# Patient Record
Sex: MALE | Race: WHITE
[De-identification: names, ages, dates, MRNs, and addresses within clinical notes are randomized per-mention and may not be internally consistent; named-entity substitution may affect disease eponyms.]

---

## 2018-07-03 ENCOUNTER — HOSPITAL ENCOUNTER (OUTPATIENT)
Dept: HOSPITAL 9 - MADRAD | Age: 48
Discharge: HOME | End: 2018-07-03
Attending: FAMILY MEDICINE
Payer: COMMERCIAL

## 2018-07-03 DIAGNOSIS — R09.89: ICD-10-CM

## 2018-07-03 DIAGNOSIS — I51.7: ICD-10-CM

## 2018-07-03 DIAGNOSIS — J45.909: Primary | ICD-10-CM

## 2018-07-03 PROCEDURE — 71046 X-RAY EXAM CHEST 2 VIEWS: CPT

## 2018-07-03 NOTE — RAD
CHEST TWO VIEWS:

7/3/18

 

HISTORY: 

Dyspnea. Cough and congestion.

 

FINDINGS:  

The cardiac silhouette is enlarged. Pulmonary vasculature upper limits of normal. Mediastinum midline
. No lobar consolidation or evidence of pneumothorax.

 

IMPRESSION:  

Cardiomegaly. Borderline pulmonary vascular congestion. 

 

POS: [facility]

## 2020-11-07 ENCOUNTER — HOSPITAL ENCOUNTER (EMERGENCY)
Dept: HOSPITAL 9 - MADERS | Age: 50
Discharge: HOME | End: 2020-11-07
Payer: SELF-PAY

## 2020-11-07 DIAGNOSIS — M10.9: ICD-10-CM

## 2020-11-07 DIAGNOSIS — I50.9: ICD-10-CM

## 2020-11-07 DIAGNOSIS — N12: Primary | ICD-10-CM

## 2020-11-07 LAB
ALBUMIN SERPL BCG-MCNC: 3.9 G/DL (ref 3.5–5)
ALP SERPL-CCNC: 49 U/L (ref 40–110)
ALT SERPL W P-5'-P-CCNC: 40 U/L (ref 8–55)
AMYLASE SERPL-CCNC: 51 U/L (ref 25–125)
ANION GAP SERPL CALC-SCNC: 15 MMOL/L (ref 10–20)
AST SERPL-CCNC: 32 U/L (ref 5–34)
BACTERIA UR QL AUTO: (no result) HPF
BASOPHILS # BLD AUTO: 0.2 THOU/UL (ref 0–0.2)
BASOPHILS NFR BLD AUTO: 1.2 % (ref 0–1)
BILIRUB SERPL-MCNC: 0.9 MG/DL (ref 0.2–1.2)
BUN SERPL-MCNC: 24 MG/DL (ref 8.9–20.6)
CALCIUM SERPL-MCNC: 8.8 MG/DL (ref 7.8–10.44)
CHLORIDE SERPL-SCNC: 105 MMOL/L (ref 98–107)
CK MB SERPL-MCNC: 3.2 NG/ML (ref 0–6.6)
CK SERPL-CCNC: 296 U/L (ref 30–200)
CO2 SERPL-SCNC: 24 MMOL/L (ref 22–29)
CREAT CL PREDICTED SERPL C-G-VRATE: 0 ML/MIN (ref 70–130)
EOSINOPHIL # BLD AUTO: 0 THOU/UL (ref 0–0.7)
EOSINOPHIL NFR BLD AUTO: 0.2 % (ref 0–10)
GLOBULIN SER CALC-MCNC: 2.6 G/DL (ref 2.4–3.5)
GLUCOSE SERPL-MCNC: 102 MG/DL (ref 70–105)
HGB BLD-MCNC: 12.5 G/DL (ref 14–18)
LIPASE SERPL-CCNC: 79 U/L (ref 8–78)
LYMPHOCYTES # BLD AUTO: 3 THOU/UL (ref 1.2–3.4)
LYMPHOCYTES NFR BLD AUTO: 17.7 % (ref 21–51)
MCH RBC QN AUTO: 28.8 PG (ref 27–31)
MCV RBC AUTO: 92.9 FL (ref 78–98)
MONOCYTES # BLD AUTO: 1.4 THOU/UL (ref 0.11–0.59)
MONOCYTES NFR BLD AUTO: 8.1 % (ref 0–10)
MUCOUS THREADS UR QL AUTO: (no result) LPF
NEUTROPHILS # BLD AUTO: 12.3 THOU/UL (ref 1.4–6.5)
NEUTROPHILS NFR BLD AUTO: 72.9 % (ref 42–75)
PLATELET # BLD AUTO: 355 THOU/UL (ref 130–400)
POTASSIUM SERPL-SCNC: 4.2 MMOL/L (ref 3.5–5.1)
PROT UR STRIP.AUTO-MCNC: 100 MG/DL
RBC # BLD AUTO: 4.35 MILL/UL (ref 4.7–6.1)
SODIUM SERPL-SCNC: 140 MMOL/L (ref 136–145)
SP GR UR STRIP: 1.03 (ref 1–1.04)
TROPONIN I SERPL DL<=0.01 NG/ML-MCNC: 0.03 NG/ML (ref ?–0.03)
WBC # BLD AUTO: 16.9 THOU/UL (ref 4.8–10.8)
WBC UR QL AUTO: (no result) HPF (ref 0–3)

## 2020-11-07 PROCEDURE — 96365 THER/PROPH/DIAG IV INF INIT: CPT

## 2020-11-07 PROCEDURE — 82553 CREATINE MB FRACTION: CPT

## 2020-11-07 PROCEDURE — 71045 X-RAY EXAM CHEST 1 VIEW: CPT

## 2020-11-07 PROCEDURE — 81003 URINALYSIS AUTO W/O SCOPE: CPT

## 2020-11-07 PROCEDURE — 93005 ELECTROCARDIOGRAM TRACING: CPT

## 2020-11-07 PROCEDURE — 82550 ASSAY OF CK (CPK): CPT

## 2020-11-07 PROCEDURE — 96375 TX/PRO/DX INJ NEW DRUG ADDON: CPT

## 2020-11-07 PROCEDURE — C9113 INJ PANTOPRAZOLE SODIUM, VIA: HCPCS

## 2020-11-07 PROCEDURE — 85025 COMPLETE CBC W/AUTO DIFF WBC: CPT

## 2020-11-07 PROCEDURE — 84484 ASSAY OF TROPONIN QUANT: CPT

## 2020-11-07 PROCEDURE — 82150 ASSAY OF AMYLASE: CPT

## 2020-11-07 PROCEDURE — 83690 ASSAY OF LIPASE: CPT

## 2020-11-07 PROCEDURE — 80053 COMPREHEN METABOLIC PANEL: CPT

## 2020-11-07 PROCEDURE — 86140 C-REACTIVE PROTEIN: CPT

## 2020-11-07 PROCEDURE — 74177 CT ABD & PELVIS W/CONTRAST: CPT

## 2020-11-07 PROCEDURE — 81015 MICROSCOPIC EXAM OF URINE: CPT

## 2020-11-07 NOTE — RAD
RADIOGRAPH CHEST 1 VIEW:

11/7/20 at 8:09 p.m.

 

HISTORY: 

49-year-old male with chest pain. 

 

FINDINGS:

There is cardiomegaly. There is no evidence of air space density, pulmonary edema, or pneumothorax. T
he lateral costophrenic angles are sharp.

 

IMPRESSION:

1) No acute pulmonary findings.

2) Cardiomegaly without congestive heart failure.

 

 

rishi []

 

POS: [facility]

## 2020-11-07 NOTE — CT
CT ABDOMEN AND PELVIS WITH IV CONTRAST:

11/7/20

 

HISTORY: 

Abdominal pain.

 

FINDINGS: 

The lung bases are unremarkable.  The liver, spleen, pancreas, adrenal glands  are normal. No calcifi
ed gallstones are seen. 

 

There is a tiny nonobstructing right renal calculus. There are patchy areas of decreased attenuation 
in the left renal cortex with adjacent perinephric inflammatory changes. No hydroureteronephrosis see
n on either side. 

 

No free air, free fluid or lymphadenopathy is seen in the abdomen or pelvis. The small bowel loops ar
e not abnormally dilated. A normal appearing appendix is present. The aorta is of normal caliber. The
re are degenerative changes in the spine. there are small bilateral fat containing inguinal hernia. 

 

IMPRESSION: 

1.      Findings are suggestive of left sided pyelonephritis. Correlation with urinalysis is recommen
ded. A follow-up exam is recommended after a course of antibiotics to exclude underlying mass. 

2.      Nonobstructing tiny right renal calculus. 

 

POS: [facility]

## 2021-03-17 ENCOUNTER — HOSPITAL ENCOUNTER (EMERGENCY)
Dept: HOSPITAL 9 - MADERS | Age: 51
Discharge: HOME | End: 2021-03-17
Payer: SELF-PAY

## 2021-03-17 DIAGNOSIS — R10.9: Primary | ICD-10-CM

## 2021-03-17 DIAGNOSIS — J30.2: ICD-10-CM

## 2021-03-17 DIAGNOSIS — R53.83: ICD-10-CM

## 2021-03-17 DIAGNOSIS — R06.00: ICD-10-CM

## 2021-03-17 DIAGNOSIS — M10.9: ICD-10-CM

## 2021-03-17 DIAGNOSIS — R05: ICD-10-CM

## 2021-03-17 DIAGNOSIS — R14.0: ICD-10-CM

## 2021-03-17 DIAGNOSIS — I50.9: ICD-10-CM

## 2021-03-17 LAB
ALBUMIN SERPL BCG-MCNC: 3.9 G/DL (ref 3.5–5)
ALP SERPL-CCNC: 52 U/L (ref 40–110)
ALT SERPL W P-5'-P-CCNC: 18 U/L (ref 8–55)
ANION GAP SERPL CALC-SCNC: 13 MMOL/L (ref 10–20)
AST SERPL-CCNC: 20 U/L (ref 5–34)
BASOPHILS # BLD AUTO: 0.2 THOU/UL (ref 0–0.2)
BASOPHILS NFR BLD AUTO: 2.5 % (ref 0–1)
BILIRUB SERPL-MCNC: 1.7 MG/DL (ref 0.2–1.2)
BUN SERPL-MCNC: 17 MG/DL (ref 8.4–25.7)
CALCIUM SERPL-MCNC: 8.5 MG/DL (ref 7.8–10.44)
CHLORIDE SERPL-SCNC: 110 MMOL/L (ref 98–107)
CK MB SERPL-MCNC: 3.6 NG/ML (ref 0–6.6)
CK SERPL-CCNC: 528 U/L (ref 30–200)
CO2 SERPL-SCNC: 23 MMOL/L (ref 22–29)
CREAT CL PREDICTED SERPL C-G-VRATE: 0 ML/MIN (ref 70–130)
EOSINOPHIL # BLD AUTO: 0.1 THOU/UL (ref 0–0.7)
EOSINOPHIL NFR BLD AUTO: 1.3 % (ref 0–10)
GLOBULIN SER CALC-MCNC: 2.9 G/DL (ref 2.4–3.5)
GLUCOSE SERPL-MCNC: 76 MG/DL (ref 70–105)
HGB BLD-MCNC: 12.2 G/DL (ref 14–18)
LIPASE SERPL-CCNC: 43 U/L (ref 8–78)
LYMPHOCYTES # BLD AUTO: 1.7 THOU/UL (ref 1.2–3.4)
LYMPHOCYTES NFR BLD AUTO: 22.8 % (ref 21–51)
MCH RBC QN AUTO: 28.4 PG (ref 27–31)
MCV RBC AUTO: 90.5 FL (ref 78–98)
MONOCYTES # BLD AUTO: 0.8 THOU/UL (ref 0.11–0.59)
MONOCYTES NFR BLD AUTO: 9.8 % (ref 0–10)
NEUTROPHILS # BLD AUTO: 4.9 THOU/UL (ref 1.4–6.5)
NEUTROPHILS NFR BLD AUTO: 63.7 % (ref 42–75)
PLATELET # BLD AUTO: 359 THOU/UL (ref 130–400)
POTASSIUM SERPL-SCNC: 4.5 MMOL/L (ref 3.5–5.1)
RBC # BLD AUTO: 4.3 MILL/UL (ref 4.7–6.1)
SODIUM SERPL-SCNC: 141 MMOL/L (ref 136–145)
SP GR UR STRIP: 1.03 (ref 1–1.04)
WBC # BLD AUTO: 7.6 THOU/UL (ref 4.8–10.8)

## 2021-03-17 PROCEDURE — 94760 N-INVAS EAR/PLS OXIMETRY 1: CPT

## 2021-03-17 PROCEDURE — 93005 ELECTROCARDIOGRAM TRACING: CPT

## 2021-03-17 PROCEDURE — 80053 COMPREHEN METABOLIC PANEL: CPT

## 2021-03-17 PROCEDURE — 83690 ASSAY OF LIPASE: CPT

## 2021-03-17 PROCEDURE — 82553 CREATINE MB FRACTION: CPT

## 2021-03-17 PROCEDURE — 71045 X-RAY EXAM CHEST 1 VIEW: CPT

## 2021-03-17 PROCEDURE — 81003 URINALYSIS AUTO W/O SCOPE: CPT

## 2021-03-17 PROCEDURE — 85025 COMPLETE CBC W/AUTO DIFF WBC: CPT

## 2021-03-17 PROCEDURE — 82550 ASSAY OF CK (CPK): CPT

## 2021-03-17 PROCEDURE — 74177 CT ABD & PELVIS W/CONTRAST: CPT

## 2021-03-17 PROCEDURE — 96372 THER/PROPH/DIAG INJ SC/IM: CPT

## 2021-03-17 PROCEDURE — 84484 ASSAY OF TROPONIN QUANT: CPT

## 2021-03-17 PROCEDURE — 36415 COLL VENOUS BLD VENIPUNCTURE: CPT

## 2021-04-18 ENCOUNTER — HOSPITAL ENCOUNTER (OUTPATIENT)
Dept: HOSPITAL 92 - ERS | Age: 51
Setting detail: OBSERVATION
LOS: 1 days | Discharge: HOME | End: 2021-04-19
Attending: INTERNAL MEDICINE | Admitting: INTERNAL MEDICINE
Payer: COMMERCIAL

## 2021-04-18 VITALS — BODY MASS INDEX: 34.3 KG/M2

## 2021-04-18 DIAGNOSIS — I50.23: Primary | ICD-10-CM

## 2021-04-18 DIAGNOSIS — D64.9: ICD-10-CM

## 2021-04-18 DIAGNOSIS — Z20.822: ICD-10-CM

## 2021-04-18 DIAGNOSIS — K76.0: ICD-10-CM

## 2021-04-18 DIAGNOSIS — I42.8: ICD-10-CM

## 2021-04-18 DIAGNOSIS — M10.9: ICD-10-CM

## 2021-04-18 DIAGNOSIS — Z79.899: ICD-10-CM

## 2021-04-18 DIAGNOSIS — R59.0: ICD-10-CM

## 2021-04-18 DIAGNOSIS — R11.2: ICD-10-CM

## 2021-04-18 DIAGNOSIS — K44.9: ICD-10-CM

## 2021-04-18 DIAGNOSIS — I42.0: ICD-10-CM

## 2021-04-18 DIAGNOSIS — R10.9: ICD-10-CM

## 2021-04-18 LAB
ALBUMIN SERPL BCG-MCNC: 3.7 G/DL (ref 3.5–5)
ALP SERPL-CCNC: 67 U/L (ref 40–110)
ALT SERPL W P-5'-P-CCNC: 23 U/L (ref 8–55)
ANION GAP SERPL CALC-SCNC: 13 MMOL/L (ref 10–20)
AST SERPL-CCNC: 28 U/L (ref 5–34)
BASOPHILS # BLD AUTO: 0.1 THOU/UL (ref 0–0.2)
BASOPHILS NFR BLD AUTO: 1.6 % (ref 0–1)
BILIRUB SERPL-MCNC: 1.9 MG/DL (ref 0.2–1.2)
BUN SERPL-MCNC: 21 MG/DL (ref 8.4–25.7)
CALCIUM SERPL-MCNC: 8.9 MG/DL (ref 7.8–10.44)
CHLORIDE SERPL-SCNC: 106 MMOL/L (ref 98–107)
CK MB SERPL-MCNC: 2 NG/ML (ref 0–6.6)
CO2 SERPL-SCNC: 21 MMOL/L (ref 22–29)
CREAT CL PREDICTED SERPL C-G-VRATE: 0 ML/MIN (ref 70–130)
DRUG SCREEN CUTOFF: (no result)
EOSINOPHIL # BLD AUTO: 0.1 THOU/UL (ref 0–0.7)
EOSINOPHIL NFR BLD AUTO: 1.2 % (ref 0–10)
GLOBULIN SER CALC-MCNC: 3.4 G/DL (ref 2.4–3.5)
GLUCOSE SERPL-MCNC: 106 MG/DL (ref 70–105)
HGB BLD-MCNC: 11.5 G/DL (ref 14–18)
LYMPHOCYTES # BLD: 1.4 THOU/UL (ref 1.2–3.4)
LYMPHOCYTES NFR BLD AUTO: 17.6 % (ref 21–51)
MCH RBC QN AUTO: 26.5 PG (ref 27–31)
MCV RBC AUTO: 88.9 FL (ref 78–98)
MEDTOX CONTROL LINE VALID?: (no result)
MEDTOX READER #: (no result)
MONOCYTES # BLD AUTO: 0.7 THOU/UL (ref 0.11–0.59)
MONOCYTES NFR BLD AUTO: 8.7 % (ref 0–10)
NEUTROPHILS # BLD AUTO: 5.8 THOU/UL (ref 1.4–6.5)
NEUTROPHILS NFR BLD AUTO: 70.8 % (ref 42–75)
PLATELET # BLD AUTO: 400 THOU/UL (ref 130–400)
POTASSIUM SERPL-SCNC: 4.4 MMOL/L (ref 3.5–5.1)
RBC # BLD AUTO: 4.34 MILL/UL (ref 4.7–6.1)
RBC UR QL AUTO: (no result) HPF (ref 0–3)
SODIUM SERPL-SCNC: 136 MMOL/L (ref 136–145)
SP GR UR STRIP: 1.02 (ref 1–1.04)
TROPONIN I SERPL DL<=0.01 NG/ML-MCNC: 0.05 NG/ML (ref ?–0.03)
TROPONIN I SERPL DL<=0.01 NG/ML-MCNC: 0.05 NG/ML (ref ?–0.03)
WBC # BLD AUTO: 8.1 THOU/UL (ref 4.8–10.8)
WBC UR QL AUTO: (no result) HPF (ref 0–3)

## 2021-04-18 PROCEDURE — 83880 ASSAY OF NATRIURETIC PEPTIDE: CPT

## 2021-04-18 PROCEDURE — 87635 SARS-COV-2 COVID-19 AMP PRB: CPT

## 2021-04-18 PROCEDURE — 80048 BASIC METABOLIC PNL TOTAL CA: CPT

## 2021-04-18 PROCEDURE — 80306 DRUG TEST PRSMV INSTRMNT: CPT

## 2021-04-18 PROCEDURE — 71275 CT ANGIOGRAPHY CHEST: CPT

## 2021-04-18 PROCEDURE — 85610 PROTHROMBIN TIME: CPT

## 2021-04-18 PROCEDURE — 82553 CREATINE MB FRACTION: CPT

## 2021-04-18 PROCEDURE — 96376 TX/PRO/DX INJ SAME DRUG ADON: CPT

## 2021-04-18 PROCEDURE — 80053 COMPREHEN METABOLIC PANEL: CPT

## 2021-04-18 PROCEDURE — U0003 INFECTIOUS AGENT DETECTION BY NUCLEIC ACID (DNA OR RNA); SEVERE ACUTE RESPIRATORY SYNDROME CORONAVIRUS 2 (SARS-COV-2) (CORONAVIRUS DISEASE [COVID-19]), AMPLIFIED PROBE TECHNIQUE, MAKING USE OF HIGH THROUGHPUT TECHNOLOGIES AS DESCRIBED BY CMS-2020-01-R: HCPCS

## 2021-04-18 PROCEDURE — G0378 HOSPITAL OBSERVATION PER HR: HCPCS

## 2021-04-18 PROCEDURE — 96375 TX/PRO/DX INJ NEW DRUG ADDON: CPT

## 2021-04-18 PROCEDURE — 84484 ASSAY OF TROPONIN QUANT: CPT

## 2021-04-18 PROCEDURE — 82607 VITAMIN B-12: CPT

## 2021-04-18 PROCEDURE — 93005 ELECTROCARDIOGRAM TRACING: CPT

## 2021-04-18 PROCEDURE — 93798 PHYS/QHP OP CAR RHAB W/ECG: CPT

## 2021-04-18 PROCEDURE — 83735 ASSAY OF MAGNESIUM: CPT

## 2021-04-18 PROCEDURE — 36415 COLL VENOUS BLD VENIPUNCTURE: CPT

## 2021-04-18 PROCEDURE — 71045 X-RAY EXAM CHEST 1 VIEW: CPT

## 2021-04-18 PROCEDURE — 93306 TTE W/DOPPLER COMPLETE: CPT

## 2021-04-18 PROCEDURE — 85730 THROMBOPLASTIN TIME PARTIAL: CPT

## 2021-04-18 PROCEDURE — 84443 ASSAY THYROID STIM HORMONE: CPT

## 2021-04-18 PROCEDURE — 96366 THER/PROPH/DIAG IV INF ADDON: CPT

## 2021-04-18 PROCEDURE — 82728 ASSAY OF FERRITIN: CPT

## 2021-04-18 PROCEDURE — 96365 THER/PROPH/DIAG IV INF INIT: CPT

## 2021-04-18 PROCEDURE — U0005 INFEC AGEN DETEC AMPLI PROBE: HCPCS

## 2021-04-18 PROCEDURE — 81001 URINALYSIS AUTO W/SCOPE: CPT

## 2021-04-18 PROCEDURE — 83540 ASSAY OF IRON: CPT

## 2021-04-18 PROCEDURE — 85025 COMPLETE CBC W/AUTO DIFF WBC: CPT

## 2021-04-18 PROCEDURE — 83550 IRON BINDING TEST: CPT

## 2021-04-19 VITALS — SYSTOLIC BLOOD PRESSURE: 98 MMHG | DIASTOLIC BLOOD PRESSURE: 65 MMHG | TEMPERATURE: 98.1 F

## 2021-04-19 LAB
ANION GAP SERPL CALC-SCNC: 11 MMOL/L (ref 10–20)
APTT PPP: 30.3 SEC (ref 22.9–36.1)
BASOPHILS # BLD AUTO: 0.1 THOU/UL (ref 0–0.2)
BASOPHILS NFR BLD AUTO: 1.3 % (ref 0–1)
BUN SERPL-MCNC: 20 MG/DL (ref 8.4–25.7)
CALCIUM SERPL-MCNC: 8.5 MG/DL (ref 7.8–10.44)
CHLORIDE SERPL-SCNC: 104 MMOL/L (ref 98–107)
CO2 SERPL-SCNC: 25 MMOL/L (ref 22–29)
CREAT CL PREDICTED SERPL C-G-VRATE: 140 ML/MIN (ref 70–130)
EOSINOPHIL # BLD AUTO: 0.2 THOU/UL (ref 0–0.7)
EOSINOPHIL NFR BLD AUTO: 2.8 % (ref 0–10)
FERRITIN SERPL-MCNC: 37.41 NG/ML (ref 22–322)
GLUCOSE SERPL-MCNC: 93 MG/DL (ref 70–105)
HGB BLD-MCNC: 12.1 G/DL (ref 14–18)
INR PPP: 1.4
IRON SERPL-MCNC: 21 UG/DL (ref 65–175)
LYMPHOCYTES # BLD: 1.7 THOU/UL (ref 1.2–3.4)
LYMPHOCYTES NFR BLD AUTO: 23.8 % (ref 21–51)
MAGNESIUM SERPL-MCNC: 1.8 MG/DL (ref 1.6–2.6)
MCH RBC QN AUTO: 30 PG (ref 27–31)
MCV RBC AUTO: 88.9 FL (ref 78–98)
MONOCYTES # BLD AUTO: 0.8 THOU/UL (ref 0.11–0.59)
MONOCYTES NFR BLD AUTO: 10.8 % (ref 0–10)
NEUTROPHILS # BLD AUTO: 4.3 THOU/UL (ref 1.4–6.5)
NEUTROPHILS NFR BLD AUTO: 61.3 % (ref 42–75)
PLATELET # BLD AUTO: 359 THOU/UL (ref 130–400)
POTASSIUM SERPL-SCNC: 3.8 MMOL/L (ref 3.5–5.1)
PROTHROMBIN TIME: 17.5 SEC (ref 12–14.7)
RBC # BLD AUTO: 4.03 MILL/UL (ref 4.7–6.1)
SODIUM SERPL-SCNC: 136 MMOL/L (ref 136–145)
UIBC SERPL-MCNC: 379 MCG/DL (ref 261–462)
VIT B12 SERPL-MCNC: 654 PG/ML (ref 211–911)
WBC # BLD AUTO: 7.1 THOU/UL (ref 4.8–10.8)

## 2021-04-19 RX ADMIN — Medication SCH ML: at 00:34

## 2021-04-19 RX ADMIN — Medication SCH ML: at 09:41

## 2023-05-03 ENCOUNTER — HOSPITAL ENCOUNTER (OUTPATIENT)
Dept: HOSPITAL 9 - MADRAD | Age: 53
Discharge: HOME | End: 2023-05-03
Payer: COMMERCIAL

## 2023-05-03 DIAGNOSIS — S23.41XA: Primary | ICD-10-CM

## 2024-10-12 ENCOUNTER — HOSPITAL ENCOUNTER (EMERGENCY)
Dept: HOSPITAL 9 - MADERS | Age: 54
Discharge: HOME | End: 2024-10-12
Payer: SELF-PAY

## 2024-10-12 DIAGNOSIS — I11.0: ICD-10-CM

## 2024-10-12 DIAGNOSIS — T63.441A: Primary | ICD-10-CM

## 2024-10-12 DIAGNOSIS — I50.9: ICD-10-CM

## 2024-10-12 PROCEDURE — 96375 TX/PRO/DX INJ NEW DRUG ADDON: CPT

## 2024-10-12 PROCEDURE — 90715 TDAP VACCINE 7 YRS/> IM: CPT

## 2024-10-12 PROCEDURE — 90471 IMMUNIZATION ADMIN: CPT

## 2024-10-12 PROCEDURE — 96374 THER/PROPH/DIAG INJ IV PUSH: CPT
